# Patient Record
Sex: FEMALE | URBAN - METROPOLITAN AREA
[De-identification: names, ages, dates, MRNs, and addresses within clinical notes are randomized per-mention and may not be internally consistent; named-entity substitution may affect disease eponyms.]

---

## 2017-01-18 ENCOUNTER — OFFICE VISIT (OUTPATIENT)
Dept: OPHTHALMOLOGY | Facility: CLINIC | Age: 37
End: 2017-01-18
Payer: COMMERCIAL

## 2017-01-18 DIAGNOSIS — H52.203 ASTIGMATISM, BILATERAL: ICD-10-CM

## 2017-01-18 DIAGNOSIS — H52.13 MYOPIA, BILATERAL: Primary | ICD-10-CM

## 2017-01-18 PROCEDURE — 92015 DETERMINE REFRACTIVE STATE: CPT | Performed by: STUDENT IN AN ORGANIZED HEALTH CARE EDUCATION/TRAINING PROGRAM

## 2017-01-18 PROCEDURE — 92002 INTRM OPH EXAM NEW PATIENT: CPT | Performed by: STUDENT IN AN ORGANIZED HEALTH CARE EDUCATION/TRAINING PROGRAM

## 2017-01-18 ASSESSMENT — REFRACTION_WEARINGRX
OS_SPHERE: PLANO
OD_SPHERE: -0.25
OD_CYLINDER: +1.25
OS_CYLINDER: +1.25
SPECS_TYPE: SVL
OD_AXIS: 022
OS_AXIS: 158

## 2017-01-18 ASSESSMENT — CONF VISUAL FIELD
OS_NORMAL: 1
OD_NORMAL: 1

## 2017-01-18 ASSESSMENT — TONOMETRY
OD_IOP_MMHG: 16
OS_IOP_MMHG: 13
IOP_METHOD: APPLANATION

## 2017-01-18 ASSESSMENT — SLIT LAMP EXAM - LIDS
COMMENTS: NORMAL
COMMENTS: NORMAL

## 2017-01-18 ASSESSMENT — VISUAL ACUITY
OS_CC: 20/20
METHOD: SNELLEN - LINEAR
OD_CC: 20/20
OD_CC: J1+
METHOD: SNELLEN - LINEAR
OS_CC: J1+
OD_SC+: -2
OD_SC: 20/50
OS_SC: 20/30

## 2017-01-18 ASSESSMENT — CUP TO DISC RATIO
OD_RATIO: 0.1 UD
OS_RATIO: 0.15 UD

## 2017-01-18 ASSESSMENT — REFRACTION_MANIFEST
OS_CYLINDER: +2.00
OD_AXIS: 013
OS_SPHERE: PLANO
OD_SPHERE: PLANO
OS_AXIS: 160
OD_CYLINDER: +2.00

## 2017-01-18 ASSESSMENT — EXTERNAL EXAM - RIGHT EYE: OD_EXAM: NORMAL

## 2017-01-18 ASSESSMENT — EXTERNAL EXAM - LEFT EYE: OS_EXAM: NORMAL

## 2017-01-18 NOTE — PROGRESS NOTES
" Current Eye Medications:  NONE     Subjective:  COMPLETE EYE EXAM   Patient complains that with her glasses \"everything is bent, and makes her ill with headache\".  Patient is relatively new to glasses.  Her first Rx being 10 years ago.  Had same problem with bending, so never wore those.    Prefers to not be dilated today     Objective:  See Ophthalmology Exam.      Assessment:  Roseline Vargas is a 36 year old female who presents with:   Encounter Diagnoses   Name Primary?     Myopia, bilateral      Astigmatism, bilateral        Plan:  Glasses prescription given - optional   Could try wearing contacts    Angélica Chang MD  [phone]        "

## 2017-01-18 NOTE — MR AVS SNAPSHOT
"              After Visit Summary   1/18/2017    Roseline Vargas    MRN: [id number]           Patient Information     Date Of Birth          1980        Visit Information        Provider Department      1/18/2017 2:00 PM Angélica Chang MD [facility]        Today's Diagnoses     Myopia, bilateral    -  1     Astigmatism, bilateral           Care Instructions    Glasses prescription given - optional   Could try wearing contacts    Angélica Chang MD  [phone]          Follow-ups after your visit        Follow-up notes from your care team     Return if symptoms worsen or fail to improve.      Who to contact     If you have questions or need follow up information about today's clinic visit or your schedule please contact [facility] directly at [phone].  Normal or non-critical lab and imaging results will be communicated to you by MyChart, letter or phone within 4 business days after the clinic has received the results. If you do not hear from us within 7 days, please contact the clinic through MyChart or phone. If you have a critical or abnormal lab result, we will notify you by phone as soon as possible.  Submit refill requests through [employer] or call your pharmacy and they will forward the refill request to us. Please allow 3 business days for your refill to be completed.          Additional Information About Your Visit        MyChart Information     [employer] lets you send messages to your doctor, view your test results, renew your prescriptions, schedule appointments and more. To sign up, go to [email or web address] . Click on \"Log in\" on the left side of the screen, which will take you to the Welcome page. Then click on \"Sign up Now\" on the right side of the page.     You will be asked to enter the access code listed below, as well as some personal information. Please follow the directions to create your username and password.     Your access code is: " [email or web address]  Expires: 2017  2:59 PM     Your access code will  in 90 days. If you need help or a new code, please call your [facility] clinic or [phone].        Care EveryWhere ID     This is your Care EveryWhere ID. This could be used by other organizations to access your [facility] medical records  [id number]         Blood Pressure from Last 3 Encounters:   No data found for BP    Weight from Last 3 Encounters:   No data found for Wt              Today, you had the following     No orders found for display       Primary Care Provider Office Phone # Fax #    Lai Russell [phone]       [place] [address]        Thank you!     Thank you for choosing [facility]  for your care. Our goal is always to provide you with excellent care. Hearing back from our patients is one way we can continue to improve our services. Please take a few minutes to complete the written survey that you may receive in the mail after your visit with us. Thank you!             Your Updated Medication List - Protect others around you: Learn how to safely use, store and throw away your medicines at www.disposemymeds.org.      Notice  As of 2017  2:59 PM    You have not been prescribed any medications.

## 2017-01-18 NOTE — PATIENT INSTRUCTIONS
Glasses prescription given - optional   Could try wearing contacts    Angélica Chang MD  [phone]